# Patient Record
Sex: MALE | Race: AMERICAN INDIAN OR ALASKA NATIVE
[De-identification: names, ages, dates, MRNs, and addresses within clinical notes are randomized per-mention and may not be internally consistent; named-entity substitution may affect disease eponyms.]

---

## 2020-08-26 ENCOUNTER — HOSPITAL ENCOUNTER (OUTPATIENT)
Dept: HOSPITAL 5 - NM | Age: 65
Discharge: HOME | End: 2020-08-26
Attending: UROLOGY
Payer: COMMERCIAL

## 2020-08-26 DIAGNOSIS — K57.31: ICD-10-CM

## 2020-08-26 DIAGNOSIS — I70.0: ICD-10-CM

## 2020-08-26 DIAGNOSIS — C61: Primary | ICD-10-CM

## 2020-08-26 DIAGNOSIS — N40.0: ICD-10-CM

## 2020-08-26 PROCEDURE — A9503 TC99M MEDRONATE: HCPCS

## 2020-08-26 PROCEDURE — 78306 BONE IMAGING WHOLE BODY: CPT

## 2020-08-26 PROCEDURE — 74176 CT ABD & PELVIS W/O CONTRAST: CPT

## 2020-08-26 NOTE — NUCLEAR MEDICINE REPORT
NUCLEAR MEDICINE BONE SCAN, WHOLE BODY



INDICATION:

C61 MALIGNANT NEOPLASM OF PROSTATE.



TECHNIQUE: 

26 mCi of Tc-99m MDP were injected IV. Whole body images were obtained.



COMPARISON: 

CT abdomen and pelvis from today.



FINDINGS:

Skeletal Structures: Fairly symmetric, likely degenerative uptake is present involving the  shoulders
, sternoclavicular joints, hips, and SI joints.

Skeletal Lesions: None.



Soft Tissues: Normal.

Kidneys: Normal, symmetric activity.

Additional Findings: None.



IMPRESSION:

1. No significant scintigraphic abnormality.



Signer Name: Cameron Anna MD 

Signed: 8/26/2020 11:51 AM

Workstation Name: [id number]

## 2020-08-26 NOTE — CAT SCAN REPORT
CT ABDOMEN AND PELVIS WITHOUT CONTRAST



INDICATION / CLINICAL INFORMATION:

C61 MALIGNANT NEOPLASM OF PROSTATE.



TECHNIQUE:

Axial CT images were obtained through the abdomen and pelvis without IV contrast.  All CT scans at [facility] location are performed using CT dose reduction for ALARA by means of automated exposure control. 



COMPARISON:

None available.



FINDINGS:



LOWER CHEST: No significant abnormality.

LIVER: No significant abnormality.

GALLBLADDER: No significant abnormality.  

BILE DUCTS: No significant abnormality.

PANCREAS: No significant abnormality.

SPLEEN: No significant abnormality.

ADRENALS: No significant abnormality.

RIGHT KIDNEY / URETER: No significant abnormality.

LEFT KIDNEY / URETER: No significant abnormality.



STOMACH / SMALL BOWEL: No significant abnormality. 

COLON: There is sigmoid diverticulosis without evidence of ventriculitis. No other significant abnorm
ality. 

APPENDIX: No significant abnormality.  

PERITONEUM: No free fluid. No free air. No fluid collection.

LYMPH NODES: No significant adenopathy.

AORTA / ARTERIES: Mild generalized atherosclerosis. No additional significant abnormality. 

IVC / VEINS: No significant abnormality.



URINARY BLADDER: No significant abnormality.

REPRODUCTIVE ORGANS: The prostate gland is enlarged, measuring 5.8 x 3.8 cm in axial dimensions witho
ut visualization of a mass.



ADDITIONAL FINDINGS: None.



SKELETAL SYSTEM: No significant abnormality.



IMPRESSION:

1. No acute abnormality of the abdomen or pelvis.

2. No evidence of metastatic disease.

3. Nonspecific enlargement of the prostate gland without visualization of a mass.

4. Additional findings as above.



Signer Name: Surjit Aaron MD 

Signed: 8/26/2020 9:28 AM

Workstation Name: [id number]

## 2022-09-22 ENCOUNTER — HOSPITAL ENCOUNTER (EMERGENCY)
Dept: HOSPITAL 5 - ED | Age: 67
LOS: 1 days | Discharge: HOME | End: 2022-09-23
Payer: MEDICARE

## 2022-09-22 VITALS — SYSTOLIC BLOOD PRESSURE: 159 MMHG | DIASTOLIC BLOOD PRESSURE: 82 MMHG
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
## 2022-09-22 DIAGNOSIS — S42.022A: Primary | ICD-10-CM
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
## 2022-09-22 PROCEDURE — 99283 EMERGENCY DEPT VISIT LOW MDM: CPT

## 2022-09-22 NOTE — XRAY REPORT
LEFT SHOULDER 3 VIEW(S)



INDICATION / CLINICAL INFORMATION: fall.



COMPARISON: None available.

 

FINDINGS:



BONES / JOINT(S): Acute fracture of the distal third of the left clavicle with the distal fracture fr
agment inferiorly displaced by one shaft width. No AC joint widening or offset. Mild AC joint degener
ative changes.

SOFT TISSUES: No significant abnormality.



ADDITIONAL FINDINGS: None.



IMPRESSION:

1. Acute fracture of the distal one third of the left clavicle with inferior displacement of the dist
al fracture fragment.



Signer Name: Estiven Ansari MD 

Signed: 9/22/2022 10:23 PM

Workstation Name: [id number]

## 2022-09-23 NOTE — EMERGENCY DEPARTMENT REPORT
ED Upper Extremity Inj HPI





- General


Chief Complaint: Extremity Injury, Upper


Stated Complaint: FALL INJURY-LEFT SHOULDER PAIN


Time Seen by Provider: 09/23/22 02:30


Source: patient


Mode of arrival: Ambulatory


Limitations: No Limitations





- History of Present Illness


Initial Comments: 





65 yo M who present with left shoulder and clavicle pain that started after a he

was pulled by his 70 lb dog. He says his dog pulled him backward and he landed 

on the concrete with instant pain in the left shoulder. He could not raise his 

shoulder above his head. He denies any sob or chest pain. He denies any 

dizziness as a cause of his fall. And he denies any other modifying or 

associated factors. 





- Related Data


                                  Previous Rx's











 Medication  Instructions  Recorded  Last Taken  Type


 


Docusate Sodium [Colace] 100 mg PO BID PRN 5 Days #10 09/23/22 Unknown Rx





 capsule NS   


 


Oxycodone HCl/Acetaminophen 1 each PO Q6HR PRN 5 Days #20 tab 09/23/22 Unknown 

Rx





[Percocet 10/325 mg] NS   











                                    Allergies











Allergy/AdvReac Type Severity Reaction Status Date / Time


 


No Known Allergies Allergy   Unverified 09/22/22 21:41














ED Review of Systems


ROS: 


Stated complaint: FALL INJURY-LEFT SHOULDER PAIN


Other details as noted in HPI





Comment: All other systems reviewed and negative


Musculoskeletal: arthralgia (left shoulder and clavicle pain )





ED Past Medical Hx





- Medications


Home Medications: 


                                Home Medications











 Medication  Instructions  Recorded  Confirmed  Last Taken  Type


 


Docusate Sodium [Colace] 100 mg PO BID PRN 5 Days #10 09/23/22  Unknown Rx





 capsule NS    


 


Oxycodone HCl/Acetaminophen 1 each PO Q6HR PRN 5 Days #20 tab 09/23/22  Unknown 

Rx





[Percocet 10/325 mg] NS    














ED Physical Exam





- General


Limitations: No Limitations


General appearance: alert, in no apparent distress





- Head


Head exam: Present: normal inspection





- Eye


Eye exam: Present: normal appearance


Pupils: Present: normal accommodation





- ENT


ENT exam: Present: normal exam, normal orophraynx, mucous membranes moist





- Neck


Neck exam: Present: normal inspection, tenderness (left upper clavicle and 

shoulder with obvious deformity ), full ROM





- Respiratory


Respiratory exam: Present: normal lung sounds bilaterally.  Absent: respiratory 

distress, chest wall tenderness, accessory muscle use





- Cardiovascular


Cardiovascular Exam: Present: regular rate, normal rhythm, normal heart sounds





- GI/Abdominal


GI/Abdominal exam: Present: soft, normal bowel sounds.  Absent: distended, 

tenderness





- Extremities Exam


Extremities exam: Present: tenderness (anterior collar palpitation ), normal 

capillary refill





- Back Exam


Back exam: Absent: tenderness





- Neurological Exam


Neurological exam: Present: alert, oriented X3





- Psychiatric


Psychiatric exam: Present: normal affect, normal mood





- Skin


Skin exam: Present: warm, normal color





ED Course





                                   Vital Signs











  09/22/22





  21:37


 


Temperature 98.2 F


 


Pulse Rate 82


 


Respiratory 16





Rate 


 


Blood Pressure 159/82





[Right] 


 


O2 Sat by Pulse 98





Oximetry 














ED Medical Decision Making





- Medical Decision Making





here with fall associated with left shoulder pain will go ahead and order xray 

shoulder and clavicle to rule out any fx or dislocation -- noted with displaced 

fx or left 2/3 distal clavicle -- on xray --pt reassured and given pain 

medication 





Pt will be discharge home on pain medication, splint placed and will have 

patient follow up with Dr Mark our Orthopedic on call for further evaluation 

and treatment as surgery for pinning might be needed.  


Critical care attestation.: 


If time is entered above; I have spent that time in minutes in the direct care 

of this critically ill patient, excluding procedure time.








ED Disposition


Clinical Impression: 


Fracture, clavicle closed, shaft


Qualifiers:


 Encounter type: initial encounter Fracture alignment: displaced Laterality: 

left Qualified Code(s): S42.022A - Displaced fracture of shaft of left clavicle,

 initial encounter for closed fracture





Disposition: 01 HOME / SELF CARE / HOMELESS


Is pt being admited?: No


Does the pt Need Aspirin: No


Condition: Stable


Instructions:  Clavicle Fracture, Easy-to-Read, How to Use a Clavicle Strap


Additional Instructions: 


It is very important that you call Dr. Mark's office in the morning to 

schedule a follow-up appointment for reevaluation and definitive treatment for 

your clavicle fracture





It is okay to take your pain medication as prescribed to continue to help your 

symptoms





Please do not hesitate to call or return to emergency room if you develop any 

shortness of breath as this could be a complication from your fracture close to 

your lungs that might need an immediate evaluation and treatment


Prescriptions: 


Docusate Sodium [Colace] 100 mg PO BID PRN 5 Days #10 capsule NS


 PRN Reason: Constipation


Oxycodone HCl/Acetaminophen [Percocet 10/325 mg] 1 each PO Q6HR PRN 5 Days #20 

tab NS


 PRN Reason: Pain


Time of Disposition: 03:02